# Patient Record
Sex: FEMALE | URBAN - METROPOLITAN AREA
[De-identification: names, ages, dates, MRNs, and addresses within clinical notes are randomized per-mention and may not be internally consistent; named-entity substitution may affect disease eponyms.]

---

## 2022-08-25 ENCOUNTER — OFFICE (OUTPATIENT)
Dept: URBAN - METROPOLITAN AREA CLINIC 109 | Facility: CLINIC | Age: 52
Setting detail: OPHTHALMOLOGY
End: 2022-08-25
Payer: MEDICARE

## 2022-08-25 DIAGNOSIS — H52.7: ICD-10-CM

## 2022-08-25 DIAGNOSIS — F07.81: ICD-10-CM

## 2022-08-25 PROCEDURE — 92015 DETERMINE REFRACTIVE STATE: CPT | Performed by: OPHTHALMOLOGY

## 2022-08-25 PROCEDURE — 92004 COMPRE OPH EXAM NEW PT 1/>: CPT | Performed by: OPHTHALMOLOGY

## 2022-08-25 ASSESSMENT — TONOMETRY
OS_IOP_MMHG: 18
OD_IOP_MMHG: 17

## 2022-08-25 ASSESSMENT — SPHEQUIV_DERIVED
OD_SPHEQUIV: -1.875
OS_SPHEQUIV: -1.875
OD_SPHEQUIV: -1.875
OS_SPHEQUIV: -1.875

## 2022-08-25 ASSESSMENT — REFRACTION_MANIFEST
OD_VA1: 20/20
OD_AXIS: 50
OD_CYLINDER: -0.25
OS_CYLINDER: -0.25
OS_SPHERE: -1.75
OS_VA1: 20/20
OD_SPHERE: -1.75
OS_AXIS: 120

## 2022-08-25 ASSESSMENT — REFRACTION_AUTOREFRACTION
OD_CYLINDER: -0.25
OD_AXIS: 047
OS_AXIS: 118
OS_SPHERE: -1.75
OD_SPHERE: -1.75
OS_CYLINDER: -0.25

## 2022-08-25 ASSESSMENT — VISUAL ACUITY
OS_BCVA: 20/70-2
OD_BCVA: 20/40-2

## 2022-08-25 ASSESSMENT — CONFRONTATIONAL VISUAL FIELD TEST (CVF)
OS_FINDINGS: FULL
OD_FINDINGS: FULL

## 2025-04-12 ENCOUNTER — EMERGENCY (EMERGENCY)
Facility: HOSPITAL | Age: 55
LOS: 1 days | End: 2025-04-12
Attending: EMERGENCY MEDICINE
Payer: MEDICARE

## 2025-04-12 VITALS
RESPIRATION RATE: 18 BRPM | TEMPERATURE: 98 F | WEIGHT: 279.99 LBS | OXYGEN SATURATION: 100 % | HEART RATE: 120 BPM | HEIGHT: 64 IN | SYSTOLIC BLOOD PRESSURE: 150 MMHG | DIASTOLIC BLOOD PRESSURE: 100 MMHG

## 2025-04-12 VITALS
DIASTOLIC BLOOD PRESSURE: 89 MMHG | RESPIRATION RATE: 18 BRPM | TEMPERATURE: 98 F | HEART RATE: 95 BPM | SYSTOLIC BLOOD PRESSURE: 141 MMHG | OXYGEN SATURATION: 99 %

## 2025-04-12 LAB
RAPID RVP RESULT: SIGNIFICANT CHANGE UP
SARS-COV-2 RNA SPEC QL NAA+PROBE: SIGNIFICANT CHANGE UP

## 2025-04-12 RX ORDER — IPRATROPIUM BROMIDE AND ALBUTEROL SULFATE .5; 2.5 MG/3ML; MG/3ML
3 SOLUTION RESPIRATORY (INHALATION) ONCE
Refills: 0 | Status: COMPLETED | OUTPATIENT
Start: 2025-04-12 | End: 2025-04-12

## 2025-04-12 RX ORDER — DEXAMETHASONE 0.5 MG/1
10 TABLET ORAL ONCE
Refills: 0 | Status: COMPLETED | OUTPATIENT
Start: 2025-04-12 | End: 2025-04-12

## 2025-04-12 RX ORDER — ALBUTEROL SULFATE 2.5 MG/3ML
2 VIAL, NEBULIZER (ML) INHALATION
Qty: 2 | Refills: 0
Start: 2025-04-12

## 2025-04-12 RX ORDER — DEXAMETHASONE 0.5 MG/1
10 TABLET ORAL ONCE
Refills: 0 | Status: DISCONTINUED | OUTPATIENT
Start: 2025-04-12 | End: 2025-04-12

## 2025-04-12 RX ADMIN — IPRATROPIUM BROMIDE AND ALBUTEROL SULFATE 3 MILLILITER(S): .5; 2.5 SOLUTION RESPIRATORY (INHALATION) at 12:15

## 2025-04-12 RX ADMIN — DEXAMETHASONE 10 MILLIGRAM(S): 0.5 TABLET ORAL at 10:49

## 2025-04-12 RX ADMIN — IPRATROPIUM BROMIDE AND ALBUTEROL SULFATE 3 MILLILITER(S): .5; 2.5 SOLUTION RESPIRATORY (INHALATION) at 10:42

## 2025-04-12 RX ADMIN — IPRATROPIUM BROMIDE AND ALBUTEROL SULFATE 3 MILLILITER(S): .5; 2.5 SOLUTION RESPIRATORY (INHALATION) at 11:03

## 2025-04-12 NOTE — ED PROVIDER NOTE - OBJECTIVE STATEMENT
Patient is a 54y woman w/no PMH presenting with an acute onset of shortness of breath and difficulty breathing. Patient was in her usual health until this morning where she felt chest tightness and difficulty breathing. Called EMS and patient received albuterol, which relieved her symptoms. Patient in the ED states that her symptoms are largely resolved and that she feels much better. Patient was never diagnosed with asthma or any lung disease. Patient denies chest pain, nausea, vomiting,

## 2025-04-12 NOTE — ED ADULT TRIAGE NOTE - CHIEF COMPLAINT QUOTE
Pt BIBA from home, c/o cough starting last night, states got worse this morning and difficulty breathing, given 1 albuterol PTA with relief, pt hypertensive

## 2025-04-12 NOTE — ED PROVIDER NOTE - PATIENT PORTAL LINK FT
You can access the FollowMyHealth Patient Portal offered by [facility] by registering at the following website: [email or web address]. By joining [facility]’s FollowMyHealth portal, you will also be able to view your health information using other applications (apps) compatible with our system.

## 2025-04-12 NOTE — ED ADULT NURSE NOTE - OBJECTIVE STATEMENT
Pt presents to the ED A&Ox4 c/o SOB and difficulty breathing for two days worsening overnight. Pt reports productive cough with white phlegm since last night. Pt reports increased difficulty breathing while laying down. Pt RR even and unlabored, NAD noted. Pt reports PMH of PCOS. Pt refusing blood work.

## 2025-04-12 NOTE — ED PROVIDER NOTE - NSFOLLOWUPINSTRUCTIONS_ED_ALL_ED_FT
Bronchospasm, Adult    Bronchospasm is when the small airways in the lungs narrow. This can make it very hard to breathe. Swelling and more mucus than normal can add to this problem.    What are the causes?  Having a cold.  Exercise.  The smell from sprays, perfumes, candles, and .  Cold air.  Stress or strong feelings such as laughing or crying.  What increases the risk?  Having asthma.  Smoking.  Being around someone who smokes (secondhand smoke).  Having allergies.  Being allergic to certain foods, medicine, or bug bites or stings.  What are the signs or symptoms?  Making a high-pitched whistling sound when you breathe, most often when you breathe out (wheezing).  Coughing.  A tight feeling in your chest.  Feeling like you cannot catch your breath.  Feeling like you have no energy to exercise.  Breathing that is noisy.  A cough that has a high pitch.  How is this treated?  Two respiratory inhalers.  Using medicines that you breathe in (inhale). These open up the airways and help you breathe. Medicines can be taken with a metered dose inhaler or a nebulizer device.  Taking medicines to reduce swelling.  Getting rid of what started the bronchospasm.  Follow these instructions at home:  Medicines    Take over-the-counter and prescription medicines only as told by your doctor.  If you need to use an inhaler or nebulizer to take your medicine, ask your doctor how to use it.  You may be given a spacer to use with your inhaler. This makes it easier to get the medicine from the inhaler into your lungs.    Lifestyle    Do not smoke or use any products that contain nicotine or tobacco. If you need help quitting, ask your doctor.  Keep track of things that start your bronchospasm. Avoid these if you can.  When pollen, air pollution, or humidity is bad, keep windows closed. Use an air conditioner if you have one.  Find ways to cope with stress and your feelings.  Activity    Some people have bronchospasm when they exercise. This is called exercise-induced bronchoconstriction (EIB). If you have this problem, talk with your doctor about how to deal with EIB. Some tips include:  Use your inhaler before exercise.  Exercise indoors if it is very cold or humid, or if the pollen and mold counts are high.  Warm up and cool down before and after exercise.  Stop your exercise right away if your symptoms start or get worse.  General instructions    If you have asthma, make sure you have an asthma action plan.  Stay up to date on your shots (immunizations).  Keep all follow-up visits.  Get help right away if:  You have trouble breathing.  You wheeze and cough and this does not get better after you take medicine.  You have chest pain.  You have trouble speaking more than one word in a sentence.  These symptoms may be an emergency. Get help right away. Call 911.  Do not wait to see if the symptoms will go away.  Do not drive yourself to the hospital.    Summary  Bronchospasm is when the small airways in the lungs narrow. Swelling and more mucus than normal can add to this problem. This can make it very hard to breathe.  Do not smoke or use any products that contain nicotine or tobacco. If you need help quitting, ask your doctor.  Get help right away if you wheeze and cough and this does not get better after you take medicine.

## 2025-04-12 NOTE — ED PROVIDER NOTE - CLINICAL SUMMARY MEDICAL DECISION MAKING FREE TEXT BOX
Patient is a 54y woman presenting to the ED with shortness of breath and chest tightness. Patient was given albuterol in EMS which largely improved her symptoms. On physical exam, patient's lungs had diminished breath sounds and moderate wheezing. Patient refused blood work. Chest XR was unremarkable, Patient was given duoneb x3 and responded very well. Patient counseled on breathing treatment and to follow-up with a pulmonologist outpatient.

## 2025-04-12 NOTE — ED PROVIDER NOTE - ATTENDING CONTRIBUTION TO CARE
Radha [age]EN[id number] female with no known medical problem presents with cough for few days and felt like she could not bring up any sputum and now feels like she is wheezing.  Patient was given 1 neb and route by the EMS.  Patient feels better after that.  No chest pain no fever.  Patient is non-smoker and has no family history of asthma or childhood history of asthma.    Patient is alert well-appearing obese female, S1-S2 normal regular, bilateral profuse wheezing with moderate aeration and decreased breath sound bilateral anteriorly.  No retractions no tachypnea patient is speaking in full sentences SPO2 is 100% on room air, abdomen soft nontender nondistended, neuroexam is alert oriented x 3 no focal deficits, skin warm dry good turgor    Patient likely has bronchitis we will treat her with DuoNebs and will do x-ray patient was offered labs but is refusing the labs as she is afraid of the needles will check RVP panel.  Chest x-ray shows no pneumonia or any gross evidence of bronchitis.  Reassess after DuoNeb's and oral Decadron and likely discharge home.  Initial EKG was normal sinus rhythm with no ischemic changes

## 2025-07-08 ENCOUNTER — APPOINTMENT (OUTPATIENT)
Age: 55
End: 2025-07-08